# Patient Record
Sex: MALE | Race: OTHER | HISPANIC OR LATINO | URBAN - METROPOLITAN AREA
[De-identification: names, ages, dates, MRNs, and addresses within clinical notes are randomized per-mention and may not be internally consistent; named-entity substitution may affect disease eponyms.]

---

## 2017-04-24 ENCOUNTER — EMERGENCY (EMERGENCY)
Facility: HOSPITAL | Age: 57
LOS: 1 days | Discharge: HOME | End: 2017-04-24
Attending: EMERGENCY MEDICINE | Admitting: EMERGENCY MEDICINE
Payer: COMMERCIAL

## 2017-04-24 VITALS
WEIGHT: 225.09 LBS | HEART RATE: 108 BPM | RESPIRATION RATE: 18 BRPM | SYSTOLIC BLOOD PRESSURE: 127 MMHG | TEMPERATURE: 98 F | OXYGEN SATURATION: 94 % | DIASTOLIC BLOOD PRESSURE: 87 MMHG

## 2017-04-24 VITALS
TEMPERATURE: 98 F | DIASTOLIC BLOOD PRESSURE: 78 MMHG | OXYGEN SATURATION: 96 % | RESPIRATION RATE: 18 BRPM | SYSTOLIC BLOOD PRESSURE: 132 MMHG | HEART RATE: 100 BPM

## 2017-04-24 DIAGNOSIS — J45.901 UNSPECIFIED ASTHMA WITH (ACUTE) EXACERBATION: ICD-10-CM

## 2017-04-24 DIAGNOSIS — Z91.018 ALLERGY TO OTHER FOODS: ICD-10-CM

## 2017-04-24 DIAGNOSIS — Z88.0 ALLERGY STATUS TO PENICILLIN: ICD-10-CM

## 2017-04-24 PROCEDURE — 99284 EMERGENCY DEPT VISIT MOD MDM: CPT | Mod: 25

## 2017-04-24 PROCEDURE — 94640 AIRWAY INHALATION TREATMENT: CPT

## 2017-04-24 RX ORDER — ALBUTEROL 90 UG/1
2 AEROSOL, METERED ORAL
Qty: 1 | Refills: 0 | OUTPATIENT
Start: 2017-04-24 | End: 2017-05-24

## 2017-04-24 RX ORDER — ALBUTEROL 90 UG/1
2.5 AEROSOL, METERED ORAL ONCE
Qty: 0 | Refills: 0 | Status: COMPLETED | OUTPATIENT
Start: 2017-04-24 | End: 2017-04-24

## 2017-04-24 RX ORDER — IPRATROPIUM/ALBUTEROL SULFATE 18-103MCG
3 AEROSOL WITH ADAPTER (GRAM) INHALATION ONCE
Qty: 0 | Refills: 0 | Status: COMPLETED | OUTPATIENT
Start: 2017-04-24 | End: 2017-04-24

## 2017-04-24 RX ORDER — ALBUTEROL 90 UG/1
3 AEROSOL, METERED ORAL
Qty: 7 | Refills: 0 | OUTPATIENT
Start: 2017-04-24 | End: 2017-05-24

## 2017-04-24 RX ADMIN — Medication 3 MILLILITER(S): at 06:26

## 2017-04-24 RX ADMIN — ALBUTEROL 2.5 MILLIGRAM(S): 90 AEROSOL, METERED ORAL at 07:12

## 2017-04-24 RX ADMIN — Medication 20 MILLIGRAM(S): at 06:26

## 2017-04-24 NOTE — ED PROVIDER NOTE - MEDICAL DECISION MAKING DETAILS
asthma exacerbation. well appearing but diffuse wheeze.  will give 20mg prednisone (60 total today) and additional nebs.  reassess.  signed out to Dr. Lemus/ NICOLE Wood pending reassessment

## 2017-04-24 NOTE — ED PROVIDER NOTE - PROGRESS NOTE DETAILS
wheezing and breathing improved with prednisone and nebs. peak flow 460. pt reports feeling better and requesting d/c home.

## 2017-04-24 NOTE — ED PROVIDER NOTE - RESPIRATORY, MLM
Breath sounds equal bilaterally, diffuse wheezing with decreased air entry.  speaking full sentences, normal work of breathing

## 2017-04-24 NOTE — ED ADULT TRIAGE NOTE - CHIEF COMPLAINT QUOTE
shortness of breath with productive cough for 4 days.Pt denies fever.  Pt was admitted at [facility] and signed AMA.

## 2017-04-24 NOTE — ED ADULT NURSE NOTE - OBJECTIVE STATEMENT
PT c/o sob and asthma symptoms. He states he went to [facility] and left [facility] when they wanted to admit him because he preferred [facility] as his primary is affiliated here. Pt states he received 4 neb treatments and steroid. He states he feels a little better yet he still feels tightness with respirations.

## 2017-04-24 NOTE — ED ADULT NURSE REASSESSMENT NOTE - NS ED NURSE REASSESS COMMENT FT1
Pt received from night NARINDER Mcwilliams.  Pt denies CP or SOB at this time at this time.  Pt has inspiratory and expetory wheezing noted.  Pt talking in complete sentences with no s.s of acute distress. MD notified of wheezing, will continue to monitor.

## 2017-04-24 NOTE — ED ADULT NURSE NOTE - CHIEF COMPLAINT QUOTE
shortness of breath with productive cough for 4 days.Pt denies fever.  Pt was admitted at [facility] and signed AMA.

## 2017-04-24 NOTE — ED PROVIDER NOTE - OBJECTIVE STATEMENT
history of asthma, here with increased trouble breathing/wheezing for the past 4 days.  Using nebulizer at home but ran out of medicine yesterday.  Was in the shower and thinks the change in temp caused his asthma to flare up last night so he went to [facility].  While there, given prednisone 40mg, 3 nebs, had labs (normal bnp, normal troponin, normal cbc, bmp) and normal cxr.  Notes that he was feeling better but still wheezing so told he needed to be admitted but did not want to stay there so signed out ama and came here

## 2020-07-29 NOTE — ED ADULT NURSE REASSESSMENT NOTE - CHEST APPEARANCE
[address] [facility] Note      Subjective:     Chief Complaint   Patient presents with    Back Pain     pt c/o recurring lower right sided back pain; he reports this episode present for 2wks; feels it feels like a pinched nerve     Benedict Bangura is a 30y.o. year old male who presents for evaluation of the following:      PMH:    Low Back Pain:   Chronic intiernnt, worse 2 week ago  -Sharp pain with working out- shoulder press  Triggers: standing, certain movemen  Character: sharp stabbing ang tingling doen the leg  Shiirting done right leg lasting seconds  Worse in morning, sitting at computer long periods then tried stadning  Working form home  Trying to walk around  Phsycial activity: regula weight trianing  Treatment: heat pad, strechitng, ice bacth, stretching  States mother had surgery on back for muscle strain int he past  Denies walking, wealing, incontinence, headache,     Overweight   Diet: Making healthier food choices  Activity: working out regularly  [employer] Readings from Last 3 Encounters:   07/29/20 210 lb 6.4 oz (95.4 kg)   07/15/19 202 lb 6.4 oz (91.8 kg)   01/17/18 224 lb (101.6 kg)     Prehypertension   No personal diagnosis of HTN  Mother and father with HTN  BP Readings from Last 3 Encounters:   07/29/20 123/83   07/15/19 140/84   01/17/18 137/83       Health Maintenance:   Diet: balanced  Activity: Works 4-5 days a week  Health Maintenance   Topic Date Due    DTaP/Tdap/Td series (1 - Tdap) 04/28/2013    Influenza Age 5 to Adult  08/01/2020    Pneumococcal 0-64 years  Aged Out     HIV or other STD testing: Due  Domestic Violence Screen:   Abuse Screening Questionnaire 7/15/2019   Do you ever feel afraid of your partner? N   Are you in a relationship with someone who physically or mentally threatens you? N   Is it safe for you to go home?  Y     Depression Screen: Denies depression or anhedonia   3 most recent PHQ Screens 7/29/2020   Little interest or pleasure in doing things Not at all   Feeling down, depressed, irritable, or hopeless Not at all   Total Score PHQ 2 0     Smoker? Never     reports being sexually active and has had partner(s) who are Female. He reports using the following method of birth control/protection: None. Prostate Check: Not due  No Fam Hx of prostate cancer   Denies groin pain/pulling, penile bleeding/deischarge, dysuria, nighttime urination. Review of Systems   Pertinent positives and negative per HPI. All other systems  reviewed are negative for a Comprehensive ROS (10+). Past Medical History:   Diagnosis Date    Allergic rhinitis         Social History     Socioeconomic History    Marital status: SINGLE     Spouse name: Not on file    Number of children: Not on file    Years of education: Not on file    Highest education level: Not on file   Occupational History    Not on file   Social Needs    Financial resource strain: Not on file    Food insecurity     Worry: Not on file     Inability: Not on file    Transportation needs     Medical: Not on file     Non-medical: Not on file   Tobacco Use    Smoking status: Former Smoker    Smokeless tobacco: Never Used   Substance and Sexual Activity    Alcohol use: Yes     Alcohol/week: 0.8 standard drinks     Types: 1 Cans of beer per week     Comment: week.     Drug use: No    Sexual activity: Yes     Partners: Female     Birth control/protection: None   Lifestyle    Physical activity     Days per week: Not on file     Minutes per session: Not on file    Stress: Not on file   Relationships    Social connections     Talks on phone: Not on file     Gets together: Not on file     Attends Rastafarian service: Not on file     Active member of club or organization: Not on file     Attends meetings of clubs or organizations: Not on file     Relationship status: Not on file    Intimate partner violence     Fear of current or ex partner: Not on file     Emotionally abused: Not on file Physically abused: Not on file     Forced sexual activity: Not on file   Other Topics Concern    Not on file   Social History Narrative    Not on file       Family History   Problem Relation Age of Onset    Hypertension Mother        Current Outpatient Medications   Medication Sig    PROAIR RESPICLICK 90 mcg/actuation aepb inhale 2 puffs by mouth every 4 to 6 hours if needed for BREATHING    cetirizine (ZYRTEC) 10 mg tablet Take  by mouth.  fluticasone (FLONASE) 50 mcg/actuation nasal spray 1-2 sprays up each nostril up to 2 times / day max dose is 4 sprays    ascorbic acid (VITAMIN C) 500 mg tablet Take  by mouth. No current facility-administered medications for this visit. Objective:     Vitals:    07/29/20 1012 07/29/20 1041 07/29/20 1042   BP: 140/72 141/88 123/83   Pulse: 83     Resp: 18     Temp: 98.1 °F (36.7 °C)     TempSrc: Oral     SpO2: 98%     Weight: 210 lb 6.4 oz (95.4 kg)     Height: 6' 1\" (1.854 m)         Physical Examination:  General: Alert, cooperative, no distress, appears stated age. Eyes: Conjunctivae clear. PERRL, EOMs intact. Ears: Normal external ear canals both ears. TM clear and mobile bilaterally  Nose: Nares normal. Septum midline. Mucosa normal. No drainage or sinus tenderness. Mouth/Throat: Lips, mucosa, and tongue normal. No oropharyngeal erythema. No tonsillar enlargement or exudate. Neck: Supple, symmetrical, trachea midline, no adenopathy. No thyroid enlargement/tenderness/nodules  Respiratory: Breathing comfortably, in no acute respiratory distress. Clear to auscultation bilaterally. Normal inspiratory and expiratory ratio. Cardiovascular: Regular rate and rhythm, S1, S2 normal, no murmur, click, rub or gallop. Extremities with no cyanosis or edema. Pulses 2+ and symmetric radial and DP  Abdomen: Soft, non-tender, non distended. Bowel sounds normal. No masses or organomegaly. Pelvic: Declined by patient.    MSK: Extremities normal, atraumatic, no effusion. Gait steady and unassisted. Back symmetric, no curvature. No CVA tenderness. - right lumbar paraspinal muscle tenderness and pain with knee extension of bother legs. - negative straight leg raise. Skin: Skin color, texture, turgor normal. No rashes or lesions on exposed skin. Lymph nodes: Cervical, supraclavicular nodes normal.  Neurologic: CNII-XII intact. Strength 5/5 grossly. Sensation and reflexes normal throughout. Psychiatric: Affect appropriate. Mood euthymic. Thoughts logical. Speech volume and speed normal      Office Visit on 06/20/2020   Component Date Value Ref Range Status    SARS-CoV-2, BELKYS 06/20/2020 Not Detected  Not Detected Final    Comment: Testing was performed using the cathie(R) SARS-CoV-2 test.  This test was developed and its performance characteristics determined  by [facility]. This test has not been FDA cleared or  approved. This test has been authorized by FDA under an Emergency Use  Authorization (EUA). This test is only authorized for the duration of  time the declaration that circumstances exist justifying the  authorization of the emergency use of in vitro diagnostic tests for  detection of SARS-CoV-2 virus and/or diagnosis of COVID-19 infection  under section 564(b)(1) of the Act, 21 U. S.C. 080VAU-3(Z)(2), unless  the authorization is terminated or revoked sooner. When diagnostic testing is negative, the possibility of a false  negative result should be considered in the context of a patient's  recent exposures and the presence of clinical signs and symptoms  consistent with COVID-19. An individual without symptoms of COVID-19  and who is not shedding SARS-CoV-2 virus would expect to have a  negati                           ve (not detected) result in this assay. Assessment/ Plan:   Diagnoses and all orders for this visit:    1. Encounter for wellness examination  -     METABOLIC PANEL, COMPREHENSIVE  -     CBC WITH AUTOMATED DIFF    2.  Acute right-sided low back pain, unspecified whether sciatica present  -     CK  -     METABOLIC PANEL, COMPREHENSIVE  -     XR SPINE LUMB 2 OR 3 V; Future    3. Prehypertension    4. Overweight    5. Routine screening for STI (sexually transmitted infection)  -     CT/NG/T.VAGINALIS AMPLIFICATION        PMH reviewed per orders. Labs to eval end organ function and etiology of chronic/acute concerns. Relevant vaccine, cancer screening and other health maintenance reviewed and updated per orders. Low back pain likely muscle strain vs spasm vs nerve impingement. Labs and xray to evaluate. Start daily exercises. Offered muscle relaxant, which patient declined. If not improved, trial physical therapy or ortho referral.   Blood pressure improved on recheck. DASH diet reccommended. Diet and lifestyle modification encouraged for weight loss. Negative depression screen. Educated patient on red flag symptoms to warrant return to clinic or emergency room visit. I have discussed the diagnosis with the patient and the intended plan as seen in the above orders. The patient has been offered or received an after-visit summary and questions were answered concerning future plans. I have discussed medication side effects and warnings with the patient as well.       Follow-up and Dispositions    · Return in about 1 year (around 7/29/2021) for Physical exam.         Signed,    Ze Dyer MD  7/29/2020 normal

## 2020-08-25 NOTE — ED PROVIDER NOTE - CONSTITUTIONAL, MLM
8/25/2020         RE: Baudilio Cameron  [id number] [facility] [id number]        Dear Colleague,    Thank you for referring your patient, Baudilio Cameron, to the [facility]. Please see a copy of my visit note below.    Wound Care     Patient seen per the order of Snow.     Wound 1:  Previous dressing removed noting minimal serosanguinous drainage on gauze (previous dressing). Wound(s) cleansed with saline.  Location: left buttocks  Measures: 2.8 x 1cm  Wound Base: 100% granulation tissue  Surrounding Tissue: intact  Odor: none  Pain: none  Action & Treatment order per doctor:   Gauze packed into wound  Patient to follow up with Dr. Adamson in a couple months or as needed.     Kellie Jay RN on 8/25/2020 at 3:47 PM      Again, thank you for allowing me to participate in the care of your patient.        Sincerely,        Samantha Adamson MD     normal... Well appearing, well nourished, awake, alert, oriented to person, place, time/situation and in no apparent distress.

## 2022-08-31 NOTE — ED PROVIDER NOTE - CARDIAC, MLM
Is This A New Presentation, Or A Follow-Up?: Growth
Normal rate, regular rhythm.  Heart sounds S1, S2.  No murmurs, rubs or gallops.

## 2023-09-22 NOTE — ED PROVIDER NOTE - MUSCULOSKELETAL, MLM
Rx Refill Note  Requested Prescriptions     Pending Prescriptions Disp Refills    FLUoxetine (PROzac) 20 MG capsule 30 capsule 2     Sig: Take 1 capsule by mouth Daily.      Last office visit with prescribing clinician: 7/19/2023   Last telemedicine visit with prescribing clinician: Visit date not found   Next office visit with prescribing clinician: Visit date not found                         Would you like a call back once the refill request has been completed: [] Yes [] No    If the office needs to give you a call back, can they leave a voicemail: [] Yes [] No    April OUMOU Piper  09/22/23, 13:22 EDT  
Spine appears normal, range of motion is not limited, no muscle or joint tenderness